# Patient Record
Sex: FEMALE | Race: OTHER | HISPANIC OR LATINO
[De-identification: names, ages, dates, MRNs, and addresses within clinical notes are randomized per-mention and may not be internally consistent; named-entity substitution may affect disease eponyms.]

---

## 2021-05-28 ENCOUNTER — APPOINTMENT (OUTPATIENT)
Dept: UROLOGY | Facility: CLINIC | Age: 66
End: 2021-05-28
Payer: MEDICAID

## 2021-05-28 VITALS
OXYGEN SATURATION: 96 % | RESPIRATION RATE: 16 BRPM | HEIGHT: 62 IN | TEMPERATURE: 98 F | WEIGHT: 156 LBS | SYSTOLIC BLOOD PRESSURE: 130 MMHG | DIASTOLIC BLOOD PRESSURE: 87 MMHG | HEART RATE: 91 BPM | BODY MASS INDEX: 28.71 KG/M2

## 2021-05-28 DIAGNOSIS — Z78.9 OTHER SPECIFIED HEALTH STATUS: ICD-10-CM

## 2021-05-28 PROCEDURE — 76775 US EXAM ABDO BACK WALL LIM: CPT

## 2021-05-28 PROCEDURE — 99204 OFFICE O/P NEW MOD 45 MIN: CPT

## 2021-05-28 RX ORDER — PHENAZOPYRIDINE 100 MG/1
100 TABLET, FILM COATED ORAL 3 TIMES DAILY
Qty: 15 | Refills: 0 | Status: ACTIVE | COMMUNITY
Start: 2021-05-28 | End: 1900-01-01

## 2021-05-28 RX ORDER — CEPHALEXIN 500 MG/1
500 TABLET ORAL 3 TIMES DAILY
Qty: 15 | Refills: 0 | Status: ACTIVE | COMMUNITY
Start: 2021-05-28 | End: 1900-01-01

## 2021-05-28 NOTE — LETTER BODY
[FreeTextEntry1] : Bandar Rivas MD\par [address], \par [facility], [address]\par [phone]\par \par Dear Dr. Rivas, \par \par Reason for Visit: Pelvic discomfort. Dysuria.\par \par This is a 65 year-old Uzbek-speaking woman with history of kidney stones, presenting with dysuria and chronic pelvic discomfort. Patient is referred for evaluation of her condition. She is accompanied today by her daughter, who acts as . The patient reports that she previously developed urosepsis from UTI, 2 years ago. She currently complains of persistent pelvic discomfort for the past 2 months. Her discomfort has not improved with an antibiotic. Patient denies any gross hematuria or urinary incontinence. The patient denies any aggravating or relieving factors. The patient denies any interference of function. The patient is entirely asymptomatic. All other review of systems are negative. She has no cancer in her family medical history. She has no previous surgical history. Past medical history, family history and social history were inquired and were noncontributory to current condition. The patient does not use tobacco or drink alcohol. Medications and allergies were reviewed. She has no known allergies to medication. \par \par On examination, the patient is a healthy-appearing woman in no acute distress. She is alert and oriented and follows commands. She has normal mood and affect. She is normocephalic. Oral no thrush. Neck is supple. Respirations are unlabored. Abdomen is soft and nontender. Liver is nonpalpable. Bladder is nonpalpable. No CVA tenderness. Neurologically she is grossly intact. No peripheral edema. Skin without gross abnormality.\par \par Assessment: Pelvic discomfort. Dysuria.\par \par I counseled the patient. I discussed the various etiologies of her symptoms. I recommended the patient obtain urinalysis and urine culture to evaluate for infection. I also recommend the patient obtain a renal ultrasound today to further evaluate the etiology of her discomfort. Risks and alternatives were discussed. I answered the patient questions. The patient will follow-up as directed and will contact me with any questions or concerns. Thank you for the opportunity to participate in the care of Ms. JENKINS. I will keep you updated on her progress. \par \par Plan: Urinalysis. Urine culture. Renal ultrasound.\par \par I personally reviewed ultrasound images with the patient today and images demonstrated both kidneys are normal in size and echogenicity without hydronephrosis, stones or solid masses present. Post-void residual on bladder scan today was 10 cc.\par \par I discussed that her renal ultrasound today was unremarkable. Her PVR was 10 cc. I recommended the patient begin a course of Keflex and a trial of Pyridium. I discussed the potential side effects of the medications. I counseled the patient on its use and side effects. If the patient develops any side effects, the patient will discontinue the medications and contact me. I discussed that if she has persistent symptoms and her urine culture is negative, then she may consider trying Oxybutynin 5 mg. I encouraged her to follow-up in 1 month.\par \par Plan: Course of Keflex. Trial of Pyridinium. Follow-up in 1 month.

## 2021-05-28 NOTE — LETTER BODY
[FreeTextEntry1] : Bandar Rivas MD\par [address], \par [facility], [address]\par [phone]\par \par Dear Dr. Rivas, \par \par Reason for Visit: Pelvic discomfort. Dysuria.\par \par This is a 65 year-old Serbian-speaking woman with history of kidney stones, presenting with dysuria and chronic pelvic discomfort. Patient is referred for evaluation of her condition. She is accompanied today by her daughter, who acts as . The patient reports that she previously developed urosepsis from UTI, 2 years ago. She currently complains of persistent pelvic discomfort for the past 2 months. Her discomfort has not improved with an antibiotic. Patient denies any gross hematuria or urinary incontinence. The patient denies any aggravating or relieving factors. The patient denies any interference of function. The patient is entirely asymptomatic. All other review of systems are negative. She has no cancer in her family medical history. She has no previous surgical history. Past medical history, family history and social history were inquired and were noncontributory to current condition. The patient does not use tobacco or drink alcohol. Medications and allergies were reviewed. She has no known allergies to medication. \par \par On examination, the patient is a healthy-appearing woman in no acute distress. She is alert and oriented and follows commands. She has normal mood and affect. She is normocephalic. Oral no thrush. Neck is supple. Respirations are unlabored. Abdomen is soft and nontender. Liver is nonpalpable. Bladder is nonpalpable. No CVA tenderness. Neurologically she is grossly intact. No peripheral edema. Skin without gross abnormality.\par \par Assessment: Pelvic discomfort. Dysuria.\par \par I counseled the patient. I discussed the various etiologies of her symptoms. I recommended the patient obtain urinalysis and urine culture to evaluate for infection. I also recommend the patient obtain a renal ultrasound today to further evaluate the etiology of her discomfort. Risks and alternatives were discussed. I answered the patient questions. The patient will follow-up as directed and will contact me with any questions or concerns. Thank you for the opportunity to participate in the care of Ms. JENKINS. I will keep you updated on her progress. \par \par Plan: Urinalysis. Urine culture. Renal ultrasound.\par \par I personally reviewed ultrasound images with the patient today and images demonstrated both kidneys are normal in size and echogenicity without hydronephrosis, stones or solid masses present. Post-void residual on bladder scan today was 10 cc.\par \par I discussed that her renal ultrasound today was unremarkable. Her PVR was 10 cc. I recommended the patient begin a course of Keflex and a trial of Pyridium. I discussed the potential side effects of the medications. I counseled the patient on its use and side effects. If the patient develops any side effects, the patient will discontinue the medications and contact me. I discussed that if she has persistent symptoms and her urine culture is negative, then she may consider trying Oxybutynin 5 mg. I encouraged her to follow-up in 1 month.\par \par Plan: Course of Keflex. Trial of Pyridinium. Follow-up in 1 month.

## 2021-05-28 NOTE — ADDENDUM
[FreeTextEntry1] : Entered by Frankie Ferris, acting as scribe for Dr. Trevon Meza.\par \par The documentation recorded by the scribe accurately reflects the service I personally performed and the decisions made by me.\par

## 2021-05-28 NOTE — PHYSICAL EXAM
[General Appearance - Well Developed] : well developed [Normal Appearance] : normal appearance [General Appearance - Well Nourished] : well nourished [Well Groomed] : well groomed [General Appearance - In No Acute Distress] : no acute distress [Edema] : no peripheral edema [Respiration, Rhythm And Depth] : normal respiratory rhythm and effort [Exaggerated Use Of Accessory Muscles For Inspiration] : no accessory muscle use [Abdomen Soft] : soft [Abdomen Tenderness] : non-tender [Costovertebral Angle Tenderness] : no ~M costovertebral angle tenderness [Urinary Bladder Findings] : the bladder was normal on palpation [Normal Station and Gait] : the gait and station were normal for the patient's age [] : no rash [No Focal Deficits] : no focal deficits [Oriented To Time, Place, And Person] : oriented to person, place, and time [Affect] : the affect was normal [Mood] : the mood was normal [Not Anxious] : not anxious [No Palpable Adenopathy] : no palpable adenopathy

## 2021-05-29 LAB
APPEARANCE: CLEAR
BACTERIA: NEGATIVE
BILIRUBIN URINE: NEGATIVE
BLOOD URINE: NEGATIVE
COLOR: NORMAL
GLUCOSE QUALITATIVE U: NEGATIVE
HYALINE CASTS: 0 /LPF
KETONES URINE: NEGATIVE
LEUKOCYTE ESTERASE URINE: NEGATIVE
MICROSCOPIC-UA: NORMAL
NITRITE URINE: NEGATIVE
PH URINE: 6.5
PROTEIN URINE: NEGATIVE
RED BLOOD CELLS URINE: 0 /HPF
SPECIFIC GRAVITY URINE: 1.01
SQUAMOUS EPITHELIAL CELLS: 0 /HPF
UROBILINOGEN URINE: NORMAL
WHITE BLOOD CELLS URINE: 1 /HPF

## 2021-05-30 LAB
BACTERIA UR CULT: NORMAL
C TRACH RRNA SPEC QL NAA+PROBE: NOT DETECTED
N GONORRHOEA RRNA SPEC QL NAA+PROBE: NOT DETECTED
SOURCE AMPLIFICATION: NORMAL

## 2021-06-07 LAB
MYCOPLASMA HOMINIS CULTURE: NEGATIVE
UREAPLASMA CULTURE: NEGATIVE

## 2021-06-15 ENCOUNTER — APPOINTMENT (OUTPATIENT)
Dept: UROLOGY | Facility: CLINIC | Age: 66
End: 2021-06-15
Payer: MEDICAID

## 2021-06-15 VITALS
HEART RATE: 86 BPM | RESPIRATION RATE: 16 BRPM | SYSTOLIC BLOOD PRESSURE: 119 MMHG | DIASTOLIC BLOOD PRESSURE: 79 MMHG | BODY MASS INDEX: 28.72 KG/M2 | TEMPERATURE: 98 F | OXYGEN SATURATION: 97 % | WEIGHT: 157 LBS

## 2021-06-15 LAB
BASOPHILS # BLD AUTO: 0.06 K/UL
BASOPHILS NFR BLD AUTO: 1.1 %
EOSINOPHIL # BLD AUTO: 0.15 K/UL
EOSINOPHIL NFR BLD AUTO: 2.7 %
HCT VFR BLD CALC: 41.1 %
HGB BLD-MCNC: 13.1 G/DL
IMM GRANULOCYTES NFR BLD AUTO: 0.4 %
LYMPHOCYTES # BLD AUTO: 2.93 K/UL
LYMPHOCYTES NFR BLD AUTO: 52.4 %
MAN DIFF?: NORMAL
MCHC RBC-ENTMCNC: 30.7 PG
MCHC RBC-ENTMCNC: 31.9 GM/DL
MCV RBC AUTO: 96.3 FL
MONOCYTES # BLD AUTO: 0.56 K/UL
MONOCYTES NFR BLD AUTO: 10 %
NEUTROPHILS # BLD AUTO: 1.87 K/UL
NEUTROPHILS NFR BLD AUTO: 33.4 %
PLATELET # BLD AUTO: 317 K/UL
RBC # BLD: 4.27 M/UL
RBC # FLD: 14 %
WBC # FLD AUTO: 5.59 K/UL

## 2021-06-15 PROCEDURE — 99214 OFFICE O/P EST MOD 30 MIN: CPT

## 2021-06-15 RX ORDER — TAMSULOSIN HYDROCHLORIDE 0.4 MG/1
0.4 CAPSULE ORAL
Qty: 30 | Refills: 3 | Status: ACTIVE | COMMUNITY
Start: 2021-06-15 | End: 1900-01-01

## 2021-06-15 NOTE — ADDENDUM
[FreeTextEntry1] : Entered by Leonidas Xavier, acting as scribe for Dr. Trevon Meza.\par \par The documentation recorded by the scribe accurately reflects the service I personally performed and the decisions made by me.

## 2021-06-15 NOTE — LETTER BODY
[FreeTextEntry1] : Bandar Rivas MD\par [address], \par [facility], [address]\par [phone]\par \par Dear Dr. Rivas, \par \par Reason for Visit: Pelvic discomfort. Dysuria. Left ureteral stone. Left renal stone. \par \par This is a 65 year-old Tajik-speaking woman with history of kidney stones, presenting with dysuria and chronic pelvic discomfort.  The patient reports that she previously developed urosepsis from UTI, 2 years ago. She currently complains of persistent pelvic discomfort for the past 2 months. Her discomfort has not improved with an antibiotic. She returns today for follow up. Since she was last seen, she has been taking Keflex and Pyridium for her pelvic discomfort. She has recently undergone a CT scan which showed a left ureteral stone and a left renal stone. Patient denies any gross hematuria or urinary incontinence  The patient denies any interference of function. The patient is entirely asymptomatic. All other review of systems are negative. Her family and social history remain unchanged. Past medical history, family history and social history were inquired and were noncontributory to current condition. Medications and allergies were reviewed. She has no known allergies to medication. \par \par On examination, the patient is a healthy-appearing woman in no acute distress. She is alert and oriented and follows commands. She has normal mood and affect. She is normocephalic. Oral no thrush. Neck is supple. Respirations are unlabored. Abdomen is soft and nontender. Liver is nonpalpable. Bladder is nonpalpable. No CVA tenderness. Neurologically she is grossly intact. No peripheral edema. Skin without gross abnormality.\par \par Her recent urinalysis was normal. Her chlamydia/GC amplification and ureaplasma/mycoplasma genital culture were negative. \par \par Her recent CT scan showed 5 mm distal left ureteral calculus, with minimal hydronephrosis and 2 mm left renal calculus. Interval resolution of small right renal calculus. \par \par Assessment: Pelvic discomfort. Dysuria. Left ureteral stone. Left renal stone. \par \par I counseled the patient. Her CT scan demonstrated a left ureteral stone and a left renal stone. I recommended the patient undergo left ureteroscopy, laser lithotripsy, stone extraction, and stent placement. I counseled the patient regarding the procedure. The risks and benefits were discussed. Alternatives were given. I answered the patient questions. The patient will take the necessary preparations for the procedure. I also recommended the patient try Flomax. I discussed the potential side effects of the medication. I counseled the patient on its use and side effects. If the patient develops any side effects, the patient will discontinue the medication and contact me. Risks and alternatives were discussed. I answered the patient questions. The patient will follow-up as directed and will contact me with any questions or concerns. Thank you for the opportunity to participate in the care of Ms. JENKINS. I will keep you updated on her progress. \par \par Plan: Left ureteroscopy, laser lithotripsy, stone extraction, and stent placement. Pre-op labs. Trial of Flomax.  Follow up as directed.

## 2021-06-16 LAB
ANION GAP SERPL CALC-SCNC: 10 MMOL/L
BUN SERPL-MCNC: 9 MG/DL
CALCIUM SERPL-MCNC: 10.8 MG/DL
CHLORIDE SERPL-SCNC: 105 MMOL/L
CO2 SERPL-SCNC: 26 MMOL/L
CREAT SERPL-MCNC: 0.68 MG/DL
GLUCOSE SERPL-MCNC: 89 MG/DL
POTASSIUM SERPL-SCNC: 4.5 MMOL/L
SODIUM SERPL-SCNC: 140 MMOL/L

## 2021-06-17 LAB — BACTERIA UR CULT: NORMAL

## 2021-06-30 ENCOUNTER — NON-APPOINTMENT (OUTPATIENT)
Age: 66
End: 2021-06-30

## 2021-06-30 RX ORDER — KETOROLAC TROMETHAMINE 10 MG/1
10 TABLET, FILM COATED ORAL EVERY 6 HOURS
Qty: 10 | Refills: 0 | Status: ACTIVE | COMMUNITY
Start: 2021-06-30 | End: 1900-01-01

## 2021-07-21 ENCOUNTER — APPOINTMENT (OUTPATIENT)
Dept: UROLOGY | Facility: HOSPITAL | Age: 66
End: 2021-07-21

## 2021-08-16 DIAGNOSIS — N39.41 URGE INCONTINENCE: ICD-10-CM

## 2021-08-27 ENCOUNTER — OUTPATIENT (OUTPATIENT)
Dept: OUTPATIENT SERVICES | Facility: HOSPITAL | Age: 66
LOS: 1 days | End: 2021-08-27
Payer: MEDICAID

## 2021-08-27 VITALS
WEIGHT: 151.9 LBS | TEMPERATURE: 98 F | DIASTOLIC BLOOD PRESSURE: 85 MMHG | OXYGEN SATURATION: 100 % | HEIGHT: 62 IN | SYSTOLIC BLOOD PRESSURE: 135 MMHG | HEART RATE: 67 BPM | RESPIRATION RATE: 16 BRPM

## 2021-08-27 DIAGNOSIS — Z98.891 HISTORY OF UTERINE SCAR FROM PREVIOUS SURGERY: Chronic | ICD-10-CM

## 2021-08-27 DIAGNOSIS — N20.1 CALCULUS OF URETER: ICD-10-CM

## 2021-08-27 DIAGNOSIS — Z00.00 ENCOUNTER FOR GENERAL ADULT MEDICAL EXAMINATION WITHOUT ABNORMAL FINDINGS: ICD-10-CM

## 2021-08-27 DIAGNOSIS — Z98.890 OTHER SPECIFIED POSTPROCEDURAL STATES: Chronic | ICD-10-CM

## 2021-08-27 DIAGNOSIS — M67.472 GANGLION, LEFT ANKLE AND FOOT: Chronic | ICD-10-CM

## 2021-08-27 DIAGNOSIS — N39.41 URGE INCONTINENCE: ICD-10-CM

## 2021-08-27 LAB
ANION GAP SERPL CALC-SCNC: 14 MMOL/L — SIGNIFICANT CHANGE UP (ref 5–17)
BUN SERPL-MCNC: 10 MG/DL — SIGNIFICANT CHANGE UP (ref 7–23)
CALCIUM SERPL-MCNC: 10.3 MG/DL — SIGNIFICANT CHANGE UP (ref 8.4–10.5)
CHLORIDE SERPL-SCNC: 106 MMOL/L — SIGNIFICANT CHANGE UP (ref 96–108)
CO2 SERPL-SCNC: 20 MMOL/L — LOW (ref 22–31)
CREAT SERPL-MCNC: 0.67 MG/DL — SIGNIFICANT CHANGE UP (ref 0.5–1.3)
GLUCOSE SERPL-MCNC: 89 MG/DL — SIGNIFICANT CHANGE UP (ref 70–99)
HCT VFR BLD CALC: 39.8 % — SIGNIFICANT CHANGE UP (ref 34.5–45)
HGB BLD-MCNC: 12.9 G/DL — SIGNIFICANT CHANGE UP (ref 11.5–15.5)
MCHC RBC-ENTMCNC: 30.1 PG — SIGNIFICANT CHANGE UP (ref 27–34)
MCHC RBC-ENTMCNC: 32.4 GM/DL — SIGNIFICANT CHANGE UP (ref 32–36)
MCV RBC AUTO: 92.8 FL — SIGNIFICANT CHANGE UP (ref 80–100)
NRBC # BLD: 0 /100 WBCS — SIGNIFICANT CHANGE UP (ref 0–0)
PLATELET # BLD AUTO: 282 K/UL — SIGNIFICANT CHANGE UP (ref 150–400)
POTASSIUM SERPL-MCNC: 3.7 MMOL/L — SIGNIFICANT CHANGE UP (ref 3.5–5.3)
POTASSIUM SERPL-SCNC: 3.7 MMOL/L — SIGNIFICANT CHANGE UP (ref 3.5–5.3)
RBC # BLD: 4.29 M/UL — SIGNIFICANT CHANGE UP (ref 3.8–5.2)
RBC # FLD: 13.2 % — SIGNIFICANT CHANGE UP (ref 10.3–14.5)
SODIUM SERPL-SCNC: 140 MMOL/L — SIGNIFICANT CHANGE UP (ref 135–145)
WBC # BLD: 5.95 K/UL — SIGNIFICANT CHANGE UP (ref 3.8–10.5)
WBC # FLD AUTO: 5.95 K/UL — SIGNIFICANT CHANGE UP (ref 3.8–10.5)

## 2021-08-27 PROCEDURE — 71046 X-RAY EXAM CHEST 2 VIEWS: CPT | Mod: 26

## 2021-08-27 PROCEDURE — 80048 BASIC METABOLIC PNL TOTAL CA: CPT

## 2021-08-27 PROCEDURE — 87086 URINE CULTURE/COLONY COUNT: CPT

## 2021-08-27 PROCEDURE — 71046 X-RAY EXAM CHEST 2 VIEWS: CPT

## 2021-08-27 PROCEDURE — G0463: CPT

## 2021-08-27 PROCEDURE — 85027 COMPLETE CBC AUTOMATED: CPT

## 2021-08-27 RX ORDER — SODIUM CHLORIDE 9 MG/ML
3 INJECTION INTRAMUSCULAR; INTRAVENOUS; SUBCUTANEOUS EVERY 8 HOURS
Refills: 0 | Status: DISCONTINUED | OUTPATIENT
Start: 2021-09-08 | End: 2021-09-22

## 2021-08-27 RX ORDER — LIDOCAINE HCL 20 MG/ML
0.2 VIAL (ML) INJECTION ONCE
Refills: 0 | Status: DISCONTINUED | OUTPATIENT
Start: 2021-09-08 | End: 2021-09-22

## 2021-08-27 RX ORDER — CEFAZOLIN SODIUM 1 G
2000 VIAL (EA) INJECTION ONCE
Refills: 0 | Status: DISCONTINUED | OUTPATIENT
Start: 2021-09-08 | End: 2021-09-22

## 2021-08-27 NOTE — H&P PST ADULT - PROBLEM SELECTOR PLAN 1
scheduled for left ureteroscopy, laser lithotripsy, stent placement, stone extraction on 09/08/21  pre op instructions given, pt verbalized understanding,  cbc, bmp, urine culture sent.  chest x-ray PA/LA-- to follow up Covid infection.

## 2021-08-27 NOTE — H&P PST ADULT - NEGATIVE NEUROLOGICAL SYMPTOMS
no transient paralysis/no weakness/no paresthesias/no generalized seizures/no focal seizures/no headache/no loss of consciousness

## 2021-08-27 NOTE — H&P PST ADULT - HISTORY OF PRESENT ILLNESS
65 year old female with PMH  of Osteoporosis (on prolia), Lumbar herniated disc, Covid 19 Infection in 03/2020 -- patient was admitted in hospital for Covid PNA, and was send home with home oxygen / 2l nc . pt was on home oxygen for 1 week then, pt asymptomatic now. Will do follow up chest x-ray today. Pt presented to [facility] for pre op evaluation prior to Left ureteroscopy, laser lithotripsy, stone extraction, stent placement on 09/08/21. Pt reports of dysuria and pelvic discomfort for 5 months, followed up with Dr. Meza, Plan for surgery. Denies chest pain, dyspnea, fever, chills, nausea, vomiting.    Denies Recent travel, Exposure or Covid symptoms  Covid PCR test on 09/5/21 @ [facility]

## 2021-08-27 NOTE — H&P PST ADULT - RS GEN PE MLT RESP DETAILS PC
normal/airway patent/breath sounds equal/good air movement/respirations non-labored/clear to auscultation bilaterally/no intercostal retractions/no rhonchi/no wheezes

## 2021-08-27 NOTE — H&P PST ADULT - NSICDXPASTMEDICALHX_GEN_ALL_CORE_FT
PAST MEDICAL HISTORY:  Hemorrhoids     Lumbar herniated disc     Osteoporosis on prolia    Pneumonia due to COVID-19 virus patient was admitted in hospital for Covid PNA, and was send home with home oxygen / 2l nc . pt was on home oxygen for 1 week then. pt asymptomatic now

## 2021-08-27 NOTE — H&P PST ADULT - ACTIVITY
walks 5-6 blocks, able to climb 2-3 flights of stairs , do all moderate house hold activities without any chest pain or dyspnea

## 2021-08-27 NOTE — H&P PST ADULT - NSICDXFAMILYHX_GEN_ALL_CORE_FT
FAMILY HISTORY:  Sibling  Still living? Yes, Estimated age: 51-60  History of hypertension, Age at diagnosis: Age Unknown

## 2021-08-28 LAB
CULTURE RESULTS: SIGNIFICANT CHANGE UP
SPECIMEN SOURCE: SIGNIFICANT CHANGE UP

## 2021-09-02 PROBLEM — M51.26 OTHER INTERVERTEBRAL DISC DISPLACEMENT, LUMBAR REGION: Chronic | Status: ACTIVE | Noted: 2021-08-27

## 2021-09-02 PROBLEM — U07.1 COVID-19: Chronic | Status: ACTIVE | Noted: 2021-08-27

## 2021-09-02 PROBLEM — K64.9 UNSPECIFIED HEMORRHOIDS: Chronic | Status: ACTIVE | Noted: 2021-08-27

## 2021-09-02 PROBLEM — M81.0 AGE-RELATED OSTEOPOROSIS WITHOUT CURRENT PATHOLOGICAL FRACTURE: Chronic | Status: ACTIVE | Noted: 2021-08-27

## 2021-09-05 ENCOUNTER — APPOINTMENT (OUTPATIENT)
Dept: DISASTER EMERGENCY | Facility: CLINIC | Age: 66
End: 2021-09-05

## 2021-09-05 DIAGNOSIS — Z01.818 ENCOUNTER FOR OTHER PREPROCEDURAL EXAMINATION: ICD-10-CM

## 2021-09-06 LAB — SARS-COV-2 N GENE NPH QL NAA+PROBE: NOT DETECTED

## 2021-09-07 ENCOUNTER — TRANSCRIPTION ENCOUNTER (OUTPATIENT)
Age: 66
End: 2021-09-07

## 2021-09-08 ENCOUNTER — APPOINTMENT (OUTPATIENT)
Dept: UROLOGY | Facility: HOSPITAL | Age: 66
End: 2021-09-08

## 2021-09-08 ENCOUNTER — RESULT REVIEW (OUTPATIENT)
Age: 66
End: 2021-09-08

## 2021-09-08 ENCOUNTER — OUTPATIENT (OUTPATIENT)
Dept: OUTPATIENT SERVICES | Facility: HOSPITAL | Age: 66
LOS: 1 days | End: 2021-09-08
Payer: MEDICAID

## 2021-09-08 VITALS
DIASTOLIC BLOOD PRESSURE: 84 MMHG | SYSTOLIC BLOOD PRESSURE: 138 MMHG | TEMPERATURE: 98 F | HEART RATE: 70 BPM | RESPIRATION RATE: 16 BRPM | HEIGHT: 62 IN | OXYGEN SATURATION: 99 % | WEIGHT: 151.9 LBS

## 2021-09-08 VITALS
HEART RATE: 63 BPM | RESPIRATION RATE: 12 BRPM | TEMPERATURE: 98 F | SYSTOLIC BLOOD PRESSURE: 124 MMHG | DIASTOLIC BLOOD PRESSURE: 62 MMHG | OXYGEN SATURATION: 99 %

## 2021-09-08 DIAGNOSIS — Z00.00 ENCOUNTER FOR GENERAL ADULT MEDICAL EXAMINATION WITHOUT ABNORMAL FINDINGS: ICD-10-CM

## 2021-09-08 DIAGNOSIS — Z98.890 OTHER SPECIFIED POSTPROCEDURAL STATES: Chronic | ICD-10-CM

## 2021-09-08 DIAGNOSIS — N20.1 CALCULUS OF URETER: ICD-10-CM

## 2021-09-08 DIAGNOSIS — M67.472 GANGLION, LEFT ANKLE AND FOOT: Chronic | ICD-10-CM

## 2021-09-08 DIAGNOSIS — Z98.891 HISTORY OF UTERINE SCAR FROM PREVIOUS SURGERY: Chronic | ICD-10-CM

## 2021-09-08 DIAGNOSIS — N39.41 URGE INCONTINENCE: ICD-10-CM

## 2021-09-08 PROCEDURE — 52356 CYSTO/URETERO W/LITHOTRIPSY: CPT | Mod: LT

## 2021-09-08 PROCEDURE — 88300 SURGICAL PATH GROSS: CPT | Mod: 26

## 2021-09-08 PROCEDURE — 74420 UROGRAPHY RTRGR +-KUB: CPT | Mod: 26

## 2021-09-08 PROCEDURE — C1758: CPT

## 2021-09-08 PROCEDURE — C2617: CPT

## 2021-09-08 PROCEDURE — 88300 SURGICAL PATH GROSS: CPT

## 2021-09-08 PROCEDURE — ZZZZZ: CPT

## 2021-09-08 PROCEDURE — C1889: CPT

## 2021-09-08 PROCEDURE — C1769: CPT

## 2021-09-08 PROCEDURE — 82365 CALCULUS SPECTROSCOPY: CPT

## 2021-09-08 PROCEDURE — 76000 FLUOROSCOPY <1 HR PHYS/QHP: CPT

## 2021-09-08 RX ORDER — OXYCODONE HYDROCHLORIDE 5 MG/1
5 TABLET ORAL ONCE
Refills: 0 | Status: DISCONTINUED | OUTPATIENT
Start: 2021-09-08 | End: 2021-09-08

## 2021-09-08 RX ORDER — ONDANSETRON 8 MG/1
4 TABLET, FILM COATED ORAL ONCE
Refills: 0 | Status: DISCONTINUED | OUTPATIENT
Start: 2021-09-08 | End: 2021-09-22

## 2021-09-08 RX ORDER — IBUPROFEN 200 MG
400 TABLET ORAL ONCE
Refills: 0 | Status: DISCONTINUED | OUTPATIENT
Start: 2021-09-08 | End: 2021-09-22

## 2021-09-08 RX ORDER — DENOSUMAB 60 MG/ML
0 INJECTION SUBCUTANEOUS
Qty: 0 | Refills: 0 | DISCHARGE

## 2021-09-08 RX ORDER — CEPHALEXIN 500 MG
1 CAPSULE ORAL
Qty: 2 | Refills: 0
Start: 2021-09-08 | End: 2021-09-08

## 2021-09-08 RX ORDER — SODIUM CHLORIDE 9 MG/ML
1000 INJECTION, SOLUTION INTRAVENOUS
Refills: 0 | Status: DISCONTINUED | OUTPATIENT
Start: 2021-09-08 | End: 2021-09-22

## 2021-09-08 NOTE — ASU DISCHARGE PLAN (ADULT/PEDIATRIC) - CALL YOUR DOCTOR IF YOU HAVE ANY OF THE FOLLOWING:
Fever greater than (need to indicate Fahrenheit or Celsius)/Nausea and vomiting that does not stop/Unable to urinate/Inability to tolerate liquids or foods Bleeding that does not stop/Swelling that gets worse/Pain not relieved by Medications/Fever greater than (need to indicate Fahrenheit or Celsius)/Wound/Surgical Site with redness, or foul smelling discharge or pus/Nausea and vomiting that does not stop/Unable to urinate/Inability to tolerate liquids or foods

## 2021-09-08 NOTE — ASU DISCHARGE PLAN (ADULT/PEDIATRIC) - PROCEDURE
cystoscopy left retrograde pyelogram left laser lithotripsy stone basketing left ureteral stent placment

## 2021-09-08 NOTE — ASU DISCHARGE PLAN (ADULT/PEDIATRIC) - CARE PROVIDER_API CALL
Trevon Meza)  Urology  [address] [place] [address], [place] [address]  Phone: [phone]  Fax: [phone]  Established Patient  Follow Up Time: 1 week

## 2021-09-08 NOTE — ASU PREOP CHECKLIST - ANTIBIOTIC
03-Jun-2018 13:00
03-Jun-2018 15:05
03-Jun-2018 17:30
03-Jun-2018 18:00
16-Jun-2018 20:30
18-Jun-2018 05:45
n/a

## 2021-09-08 NOTE — ASU PATIENT PROFILE, ADULT - FALL HARM RISK
bones(Osteoporosis,prev fx,steroid use,metastatic bone ca bones(Osteoporosis,prev fx,steroid use,metastatic bone ca/surgery

## 2021-09-09 ENCOUNTER — NON-APPOINTMENT (OUTPATIENT)
Age: 66
End: 2021-09-09

## 2021-09-09 RX ORDER — OXYCODONE AND ACETAMINOPHEN 5; 325 MG/1; MG/1
5-325 TABLET ORAL
Qty: 6 | Refills: 0 | Status: ACTIVE | COMMUNITY
Start: 2021-09-09 | End: 1900-01-01

## 2021-09-13 LAB — SURGICAL PATHOLOGY STUDY: SIGNIFICANT CHANGE UP

## 2021-09-14 LAB — NIDUS STONE QN: SIGNIFICANT CHANGE UP

## 2021-09-17 ENCOUNTER — APPOINTMENT (OUTPATIENT)
Dept: UROLOGY | Facility: CLINIC | Age: 66
End: 2021-09-17
Payer: MEDICAID

## 2021-09-17 ENCOUNTER — APPOINTMENT (OUTPATIENT)
Dept: UROLOGY | Facility: CLINIC | Age: 66
End: 2021-09-17

## 2021-09-17 ENCOUNTER — NON-APPOINTMENT (OUTPATIENT)
Age: 66
End: 2021-09-17

## 2021-09-17 VITALS
DIASTOLIC BLOOD PRESSURE: 83 MMHG | WEIGHT: 157 LBS | OXYGEN SATURATION: 100 % | SYSTOLIC BLOOD PRESSURE: 127 MMHG | BODY MASS INDEX: 28.72 KG/M2 | TEMPERATURE: 97.2 F | HEART RATE: 77 BPM | RESPIRATION RATE: 16 BRPM

## 2021-09-17 PROCEDURE — 52310 CYSTOSCOPY AND TREATMENT: CPT

## 2021-09-17 RX ORDER — KETOROLAC TROMETHAMINE 30 MG/ML
30 INJECTION, SOLUTION INTRAMUSCULAR; INTRAVENOUS
Qty: 1 | Refills: 0 | Status: COMPLETED | OUTPATIENT
Start: 2021-09-17

## 2021-09-17 RX ORDER — KETOROLAC TROMETHAMINE 15 MG/ML
15 INJECTION, SOLUTION INTRAMUSCULAR; INTRAVENOUS
Qty: 2 | Refills: 0 | Status: COMPLETED | OUTPATIENT
Start: 2021-09-17 | End: 2021-09-17

## 2021-10-22 ENCOUNTER — APPOINTMENT (OUTPATIENT)
Dept: UROLOGY | Facility: CLINIC | Age: 66
End: 2021-10-22
Payer: MEDICAID

## 2021-10-22 VITALS — TEMPERATURE: 97.6 F

## 2021-10-22 PROCEDURE — 76775 US EXAM ABDO BACK WALL LIM: CPT

## 2021-10-22 PROCEDURE — 99213 OFFICE O/P EST LOW 20 MIN: CPT | Mod: 25

## 2021-10-22 NOTE — HISTORY OF PRESENT ILLNESS
[FreeTextEntry1] : Please refer to URO Consult note \par \par US no more stone or hydro \par calcium oxalate stones \par stone diet \par declines 24 hr urinalysis \par FU in 6 months \par

## 2021-10-22 NOTE — LETTER BODY
[FreeTextEntry1] : Bandar Rivas MD\par [address], \par [facility], [address]\par [phone]\par \par Dear Dr. Rivas, \par \par Reason for Visit: Pelvic discomfort. Dysuria. Left ureteral stone. Left renal stone. \par \par This is a 65 year-old Tamazight-speaking woman with history of kidney stones, dysuria and pelvic discomfort presenting with left ureteral stone and left renal stone s/p left ureteroscopy. The patient reports that she previously developed urosepsis from UTI, 2 years ago. She has previously taking Keflex and Pyridium for her pelvic discomfort. Her CT scan in August 2021 demonstrated a left ureteral stone and a left renal stone. The patient underwent left ureteroscopy, laser lithotripsy, stone extraction, and stent placement in September. She returns today for renal ultrasound. Since she was last seen, her stone analysis demonstrated calcium oxalate stones. Patient denies any gross hematuria or urinary incontinence. All other review of systems are negative. Her family and social history remain unchanged. Past medical history, family history and social history were inquired and were noncontributory to current condition. Medications and allergies were reviewed. She has no known allergies to medication. \par \par On examination, the patient is a healthy-appearing woman in no acute distress. She is alert and oriented and follows commands. She has normal mood and affect. She is normocephalic. Oral no thrush. Neck is supple. Respirations are unlabored. Abdomen is soft and nontender. Liver is nonpalpable. Bladder is nonpalpable. No CVA tenderness. Neurologically she is grossly intact. No peripheral edema. Skin without gross abnormality.\par \par Calculus analysis demonstrated calcium oxalate stones. \par \par I personally reviewed ultrasound images with the patient today and images demonstrated both kidneys appear normal in size and echogenicity. No stones, solid masses or hydronephrosis visualized.\par \par Assessment: Pelvic discomfort. Dysuria. Left ureteral stone. Left renal stone. \par \par I counseled the patient. Her stone analysis demonstrated calcium oxalate stones. Her renal ultrasound today demonstrated demonstrated no evidence of stone or hydronephrosis. I recommended the patient undergo 24 hour urinalysis. The patient declines 24 hour urinalysis. I encouraged patient to maintain a low-protein low-sodium diet. I also encouraged hydration to prevent stone formation. I recommended the patient follow up in 6 months for repeat renal ultrasound to ensure stability. Risks and alternatives were discussed. I answered the patient questions. The patient will follow-up as directed and will contact me with any questions or concerns. Thank you for the opportunity to participate in the care of Ms. JENKINS. I will keep you updated on her progress. \par \par Plan: Stone diet. Follow up in 6 months for renal ultrasound.

## 2022-04-29 ENCOUNTER — APPOINTMENT (OUTPATIENT)
Dept: UROLOGY | Facility: CLINIC | Age: 67
End: 2022-04-29

## 2022-06-23 ENCOUNTER — APPOINTMENT (OUTPATIENT)
Dept: UROLOGY | Facility: CLINIC | Age: 67
End: 2022-06-23
Payer: MEDICAID

## 2022-06-23 PROCEDURE — 99214 OFFICE O/P EST MOD 30 MIN: CPT

## 2022-06-24 ENCOUNTER — RESULT REVIEW (OUTPATIENT)
Age: 67
End: 2022-06-24

## 2022-06-25 LAB
ANION GAP SERPL CALC-SCNC: 12 MMOL/L
APPEARANCE: CLEAR
BACTERIA UR CULT: NORMAL
BACTERIA: NEGATIVE
BASOPHILS # BLD AUTO: 0.06 K/UL
BASOPHILS NFR BLD AUTO: 0.9 %
BILIRUBIN URINE: NEGATIVE
BLOOD URINE: NORMAL
BUN SERPL-MCNC: 14 MG/DL
CALCIUM OXALATE CRYSTALS: ABNORMAL
CALCIUM SERPL-MCNC: 10.6 MG/DL
CHLORIDE SERPL-SCNC: 102 MMOL/L
CO2 SERPL-SCNC: 26 MMOL/L
COLOR: YELLOW
CREAT SERPL-MCNC: 0.78 MG/DL
EGFR: 85 ML/MIN/1.73M2
EOSINOPHIL # BLD AUTO: 0.11 K/UL
EOSINOPHIL NFR BLD AUTO: 1.7 %
GLUCOSE QUALITATIVE U: NEGATIVE
GLUCOSE SERPL-MCNC: 81 MG/DL
HCT VFR BLD CALC: 38.9 %
HGB BLD-MCNC: 12.5 G/DL
HYALINE CASTS: 3 /LPF
IMM GRANULOCYTES NFR BLD AUTO: 0.2 %
KETONES URINE: NEGATIVE
LEUKOCYTE ESTERASE URINE: NEGATIVE
LYMPHOCYTES # BLD AUTO: 2.75 K/UL
LYMPHOCYTES NFR BLD AUTO: 42.9 %
MAN DIFF?: NORMAL
MCHC RBC-ENTMCNC: 30.9 PG
MCHC RBC-ENTMCNC: 32.1 GM/DL
MCV RBC AUTO: 96.3 FL
MICROSCOPIC-UA: NORMAL
MONOCYTES # BLD AUTO: 0.99 K/UL
MONOCYTES NFR BLD AUTO: 15.4 %
NEUTROPHILS # BLD AUTO: 2.49 K/UL
NEUTROPHILS NFR BLD AUTO: 38.9 %
NITRITE URINE: NEGATIVE
PH URINE: 6
PLATELET # BLD AUTO: 280 K/UL
POTASSIUM SERPL-SCNC: 4.7 MMOL/L
PROTEIN URINE: NORMAL
RBC # BLD: 4.04 M/UL
RBC # FLD: 13.8 %
RED BLOOD CELLS URINE: 6 /HPF
SODIUM SERPL-SCNC: 140 MMOL/L
SPECIFIC GRAVITY URINE: 1.02
SQUAMOUS EPITHELIAL CELLS: 2 /HPF
URINE COMMENTS: NORMAL
UROBILINOGEN URINE: NORMAL
WBC # FLD AUTO: 6.41 K/UL
WHITE BLOOD CELLS URINE: 5 /HPF

## 2022-06-26 ENCOUNTER — APPOINTMENT (OUTPATIENT)
Dept: CT IMAGING | Facility: IMAGING CENTER | Age: 67
End: 2022-06-26

## 2022-06-26 ENCOUNTER — OUTPATIENT (OUTPATIENT)
Dept: OUTPATIENT SERVICES | Facility: HOSPITAL | Age: 67
LOS: 1 days | End: 2022-06-26
Payer: MEDICAID

## 2022-06-26 DIAGNOSIS — Z98.891 HISTORY OF UTERINE SCAR FROM PREVIOUS SURGERY: Chronic | ICD-10-CM

## 2022-06-26 DIAGNOSIS — R30.0 DYSURIA: ICD-10-CM

## 2022-06-26 DIAGNOSIS — M67.472 GANGLION, LEFT ANKLE AND FOOT: Chronic | ICD-10-CM

## 2022-06-26 DIAGNOSIS — Z98.890 OTHER SPECIFIED POSTPROCEDURAL STATES: Chronic | ICD-10-CM

## 2022-06-26 DIAGNOSIS — R10.2 PELVIC AND PERINEAL PAIN: ICD-10-CM

## 2022-06-26 DIAGNOSIS — Z00.00 ENCOUNTER FOR GENERAL ADULT MEDICAL EXAMINATION WITHOUT ABNORMAL FINDINGS: ICD-10-CM

## 2022-06-26 PROCEDURE — 74176 CT ABD & PELVIS W/O CONTRAST: CPT | Mod: 26

## 2022-06-26 PROCEDURE — 74176 CT ABD & PELVIS W/O CONTRAST: CPT

## 2022-06-26 NOTE — LETTER BODY
[FreeTextEntry1] : Bandar Rivas MD\par [address], \par [facility], [address]\par [phone]\par \par Dear Dr. Rivas, \par \par Reason for Visit: Left ureteral stone and renal stone s/p left ureteroscopy. Left flank pain. \par \par This is a 66 year-old Japanese-speaking woman with history of kidney stones, urosepsis and previous dysuria presenting with left ureteral stone and left renal stone s/p left ureteroscopy in September 2021. Stone analysis demonstrated calcium oxalate stones. Her renal ultrasound in October 2021 demonstrated no evidence of any stones or hydronephrosis. The patient returns today for follow up. Since she was last seen, the patient complains of left flank pain. She also notes she developed a fever, which has since resolved. The patient denies any gross hematuria or urinary incontinence. All other review of systems are negative. Her family and social history remain unchanged. Past medical history, family history and social history were inquired and were noncontributory to current condition. Medications and allergies were reviewed. She has no known allergies to medication. \par \par On examination, the patient is a healthy-appearing woman in no acute distress. She is alert and oriented and follows commands. She has normal mood and affect. She is normocephalic. Oral no thrush. Neck is supple. Respirations are unlabored. Abdomen is soft and nontender. Liver is nonpalpable. Bladder is nonpalpable. No CVA tenderness. Neurologically she is grossly intact. No peripheral edema. Skin without gross abnormality.\par \par Assessment:Left ureteral stone and renal stone s/p left ureteroscopy. Left flank pain. \par \par I counseled the patient. In terms of her left flank pain, I recommended the patient undergo a CT of abdomen and pelvis to evaluate for any stone or hydronephrosis. I counseled the patient regarding the procedure. The risks and benefits were discussed. Alternatives were given. I answered the patient questions. The patient will take the necessary preparations for the procedure. I also recommended she obtain BMP to monitor renal functions. She will obtain urinalysis and urine culture today to evaluate for infection. If she develops a fever again, I instructed the patient to go to the emergency department. The patient will follow-up as directed and will contact me with any questions or concerns. Thank you for the opportunity to participate in the care of this patient, I will keep you updated on her progress. \par \par Plan: CT of abdomen and pelvis. BMP. Urinalysis. Urine culture. Follow up as directed

## 2022-11-03 ENCOUNTER — NON-APPOINTMENT (OUTPATIENT)
Age: 67
End: 2022-11-03

## 2022-11-05 DIAGNOSIS — R30.0 DYSURIA: ICD-10-CM

## 2022-11-05 DIAGNOSIS — R10.2 PELVIC AND PERINEAL PAIN: ICD-10-CM

## 2022-11-05 DIAGNOSIS — N20.1 CALCULUS OF URETER: ICD-10-CM

## 2022-11-05 DIAGNOSIS — Z00.00 ENCOUNTER FOR GENERAL ADULT MEDICAL EXAMINATION W/OUT ABNORMAL FINDINGS: ICD-10-CM

## 2022-11-05 DIAGNOSIS — G89.29 PELVIC AND PERINEAL PAIN: ICD-10-CM

## 2023-01-27 NOTE — ASU PREOP CHECKLIST - SITE MARKED BY SURGEON
Bladder scan showed 417 mL, MD made aware.     Straight cath ordered and Urology placed on consult.    yes
